# Patient Record
Sex: MALE | Race: WHITE | URBAN - METROPOLITAN AREA
[De-identification: names, ages, dates, MRNs, and addresses within clinical notes are randomized per-mention and may not be internally consistent; named-entity substitution may affect disease eponyms.]

---

## 2023-02-13 ENCOUNTER — OFFICE VISIT (OUTPATIENT)
Dept: URBAN - METROPOLITAN AREA CLINIC 51 | Facility: CLINIC | Age: 28
End: 2023-02-13
Payer: COMMERCIAL

## 2023-02-13 DIAGNOSIS — H04.123 TEAR FILM INSUFFICIENCY OF BILATERAL LACRIMAL GLANDS: ICD-10-CM

## 2023-02-13 DIAGNOSIS — H02.88A MEIBOMIAN GLAND DYSFNCT RIGHT EYE, UPPER AND LOWER EYELIDS: Primary | ICD-10-CM

## 2023-02-13 DIAGNOSIS — H53.2 DIPLOPIA: ICD-10-CM

## 2023-02-13 DIAGNOSIS — H35.389 TOXIC MACULOPATHY, UNSPECIFIED EYE: ICD-10-CM

## 2023-02-13 DIAGNOSIS — H00.021 HORDEOLUM INTERNUM (MEIBOMIAN GLAND DYSFUNCTION), RIGHT UPPER LID: ICD-10-CM

## 2023-02-13 DIAGNOSIS — H00.024 HORDEOLUM INTERNUM (MEIBOMIAN GLAND DYSFUNCTION), LEFT UPPER LID: ICD-10-CM

## 2023-02-13 DIAGNOSIS — H00.022 HORDEOLUM INTERNUM (MEIBOMIAN GLAND DYSFUNCTION), RIGHT LOWER LID: ICD-10-CM

## 2023-02-13 DIAGNOSIS — H35.013 CHANGES IN RETINAL VASCULAR APPEARANCE, BILATERAL: ICD-10-CM

## 2023-02-13 DIAGNOSIS — H00.025 HORDEOLUM INTERNUM (MEIBOMIAN GLAND DYSFUNCTION), LEFT LOWER LID: ICD-10-CM

## 2023-02-13 PROCEDURE — 92134 CPTRZ OPH DX IMG PST SGM RTA: CPT | Performed by: OPTOMETRIST

## 2023-02-13 PROCEDURE — 99204 OFFICE O/P NEW MOD 45 MIN: CPT | Performed by: OPTOMETRIST

## 2023-02-13 PROCEDURE — 92133 CPTRZD OPH DX IMG PST SGM ON: CPT | Performed by: OPTOMETRIST

## 2023-02-13 ASSESSMENT — KERATOMETRY
OS: 43.13
OD: 43.25

## 2023-02-13 ASSESSMENT — INTRAOCULAR PRESSURE
OD: 15
OS: 15

## 2023-02-13 ASSESSMENT — VISUAL ACUITY
OS: 20/25
OD: 20/20

## 2023-02-13 NOTE — IMPRESSION/PLAN
Impression: Diplopia: H53.2.  Plan: intermittent will refer to [facility]

## 2023-02-13 NOTE — IMPRESSION/PLAN
Impression: Tear film insufficiency of bilateral lacrimal glands: H04.123. Plan: Recommend use:
Lipid based Artificial tears QID OU 
> 600 mg Omega 3 per day Hydrate / Add humidifier / Avoid direct air flow / Blink fully Handout provided

## 2023-02-13 NOTE — IMPRESSION/PLAN
Impression: Changes in retinal vascular appearance, bilateral: H35.013. Plan:  Torturous vessels, monitor with photos

## 2023-02-13 NOTE — IMPRESSION/PLAN
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  Plan: h/o alcoholism intermittent vision loss 
moct normal 
rnlf normal 
recommend hvf 30-2

## 2023-02-13 NOTE — IMPRESSION/PLAN
Impression: Hordeolum internum (Meibomian gland dysfunction), right upper lid: H00.021. Plan: Warm compress BID OU X 5 min Lipid based artificial tears QID OU Eyelid scrubs
handout provided , reviewed in detail with pt

## 2023-02-13 NOTE — IMPRESSION/PLAN
Impression: Hordeolum internum (Meibomian gland dysfunction), right lower lid: H00.022.  Plan: see other plan

## 2023-02-13 NOTE — IMPRESSION/PLAN
Impression: Hordeolum internum (Meibomian gland dysfunction), left upper lid: H00.024.  Plan: see other plan

## 2023-02-13 NOTE — IMPRESSION/PLAN
Impression: Hordeolum internum (Meibomian gland dysfunction), left lower lid: H00.025.  Plan: see other plan